# Patient Record
Sex: FEMALE | Race: WHITE | NOT HISPANIC OR LATINO | URBAN - METROPOLITAN AREA
[De-identification: names, ages, dates, MRNs, and addresses within clinical notes are randomized per-mention and may not be internally consistent; named-entity substitution may affect disease eponyms.]

---

## 2018-01-01 ENCOUNTER — EMERGENCY (EMERGENCY)
Facility: HOSPITAL | Age: 73
LOS: 0 days | End: 2018-09-20
Attending: EMERGENCY MEDICINE | Admitting: EMERGENCY MEDICINE

## 2018-01-01 ENCOUNTER — EMERGENCY (EMERGENCY)
Facility: HOSPITAL | Age: 73
LOS: 0 days | Discharge: HOME | End: 2018-08-09
Admitting: PHYSICIAN ASSISTANT

## 2018-01-01 VITALS
SYSTOLIC BLOOD PRESSURE: 106 MMHG | RESPIRATION RATE: 18 BRPM | HEART RATE: 103 BPM | TEMPERATURE: 98 F | OXYGEN SATURATION: 100 % | DIASTOLIC BLOOD PRESSURE: 43 MMHG

## 2018-01-01 VITALS — HEIGHT: 57 IN | WEIGHT: 149.91 LBS

## 2018-01-01 VITALS
TEMPERATURE: 99 F | SYSTOLIC BLOOD PRESSURE: 170 MMHG | DIASTOLIC BLOOD PRESSURE: 74 MMHG | OXYGEN SATURATION: 100 % | HEART RATE: 90 BPM | RESPIRATION RATE: 18 BRPM

## 2018-01-01 DIAGNOSIS — Z95.1 PRESENCE OF AORTOCORONARY BYPASS GRAFT: ICD-10-CM

## 2018-01-01 DIAGNOSIS — R04.0 EPISTAXIS: ICD-10-CM

## 2018-01-01 DIAGNOSIS — I46.9 CARDIAC ARREST, CAUSE UNSPECIFIED: ICD-10-CM

## 2018-01-01 DIAGNOSIS — Z79.899 OTHER LONG TERM (CURRENT) DRUG THERAPY: ICD-10-CM
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
## 2018-01-01 DIAGNOSIS — Z79.82 LONG TERM (CURRENT) USE OF ASPIRIN: ICD-10-CM

## 2018-01-01 DIAGNOSIS — M79.89 OTHER SPECIFIED SOFT TISSUE DISORDERS: ICD-10-CM

## 2018-01-01 DIAGNOSIS — Z88.0 ALLERGY STATUS TO PENICILLIN: ICD-10-CM

## 2018-01-01 DIAGNOSIS — E78.00 PURE HYPERCHOLESTEROLEMIA, UNSPECIFIED: ICD-10-CM

## 2018-01-01 DIAGNOSIS — I10 ESSENTIAL (PRIMARY) HYPERTENSION: ICD-10-CM
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
## 2018-01-01 DIAGNOSIS — I25.10 ATHEROSCLEROTIC HEART DISEASE OF NATIVE CORONARY ARTERY WITHOUT ANGINA PECTORIS: ICD-10-CM
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
## 2018-01-01 DIAGNOSIS — Z95.1 PRESENCE OF AORTOCORONARY BYPASS GRAFT: Chronic | ICD-10-CM

## 2018-01-01 LAB
BASE EXCESS BLDV CALC-SCNC: 3.2 MMOL/L — HIGH (ref -2–2)
HCO3 BLDV-SCNC: 28 MMOL/L — SIGNIFICANT CHANGE UP (ref 22–29)
LACTATE BLDV-MCNC: 2 MMOL/L — HIGH (ref 0.5–1.6)
PCO2 BLDV: 40 MMHG — LOW (ref 41–51)
PH BLDV: 7.45 — HIGH (ref 7.26–7.43)
PO2 BLDV: 34 MMHG — SIGNIFICANT CHANGE UP (ref 20–40)
SAO2 % BLDV: 67 % — SIGNIFICANT CHANGE UP

## 2018-01-01 RX ORDER — BUMETANIDE 0.25 MG/ML
1 INJECTION INTRAMUSCULAR; INTRAVENOUS
Qty: 0 | Refills: 0 | COMMUNITY

## 2018-01-01 RX ORDER — AMLODIPINE BESYLATE 2.5 MG/1
1 TABLET ORAL
Qty: 0 | Refills: 0 | COMMUNITY

## 2018-01-01 RX ORDER — LEVOTHYROXINE SODIUM 125 MCG
1 TABLET ORAL
Qty: 0 | Refills: 0 | COMMUNITY

## 2018-01-01 RX ORDER — ESCITALOPRAM OXALATE 10 MG/1
1 TABLET, FILM COATED ORAL
Qty: 0 | Refills: 0 | COMMUNITY

## 2018-01-01 RX ORDER — EZETIMIBE AND SIMVASTATIN 10; 80 MG/1; MG/1
1 TABLET, FILM COATED ORAL
Qty: 0 | Refills: 0 | COMMUNITY

## 2018-01-01 RX ORDER — METOPROLOL TARTRATE 50 MG
1 TABLET ORAL
Qty: 0 | Refills: 0 | COMMUNITY

## 2018-01-01 RX ORDER — LOSARTAN POTASSIUM 100 MG/1
1 TABLET, FILM COATED ORAL
Qty: 0 | Refills: 0 | COMMUNITY

## 2018-01-01 RX ORDER — ASPIRIN/CALCIUM CARB/MAGNESIUM 324 MG
1 TABLET ORAL
Qty: 0 | Refills: 0 | COMMUNITY

## 2018-08-09 NOTE — ED PROVIDER NOTE - MUSCULOSKELETAL, MLM
Spine appears normal, range of motion is not limited, no muscle or joint tenderness; (+) chronic swelling of bilateral lower extremities

## 2018-08-09 NOTE — ED PROVIDER NOTE - MEDICAL DECISION MAKING DETAILS
pt counseled on post-epistaxis care; told to continue all present meds; ENT referral; follow up with PCP in 2 days; any new or worsening symptoms, pt will return to ER pt counseled on post-epistaxis care; told to continue all present meds; ENT referral; follow up with PCP in 2 days; any new or worsening symptoms, pt will return to ER  Note complete

## 2018-08-09 NOTE — ED PROVIDER NOTE - OBJECTIVE STATEMENT
72 y.o. female with a PMHx of HTN and ASHD presented to the ER c/o Left sided epistaxis which began around 8 AM.  Pt states she was cleaning the inside of her nose with a tissue right before the bleeding started.  Pt on Baby ASA and Prasugrel.

## 2018-08-09 NOTE — ED PROVIDER NOTE - ENMT, MLM
Airway patent, (+) dry blood noted Left inner nasal mucosa without any active bleeding. Mouth with normal mucosa. Throat has no vesicles, no oropharyngeal exudates and uvula is midline. No postpharyngeal bleeding.

## 2018-08-09 NOTE — ED ADULT NURSE NOTE - OBJECTIVE STATEMENT
Pt stated pt was blowing her hose and c/o L sided nosebleed x 1day. Pt on Prasuguel and aspirin. Denies any pain or symptoms

## 2018-09-20 PROBLEM — E78.00 PURE HYPERCHOLESTEROLEMIA, UNSPECIFIED: Chronic | Status: ACTIVE | Noted: 2018-01-01

## 2018-09-20 PROBLEM — I10 ESSENTIAL (PRIMARY) HYPERTENSION: Chronic | Status: ACTIVE | Noted: 2018-01-01

## 2018-09-20 PROBLEM — M79.89 OTHER SPECIFIED SOFT TISSUE DISORDERS: Chronic | Status: ACTIVE | Noted: 2018-01-01

## 2018-09-20 NOTE — AIRWAY PLACEMENT NOTE ADULT - POST AIRWAY PLACEMENT ASSESSMENT:
positive end tidal CO2 noted/breath sounds bilateral/breath sounds equal/CXR pending/skin color improved/chest excursion noted

## 2018-09-20 NOTE — ED PROVIDER NOTE - ATTENDING CONTRIBUTION TO CARE
[facility] 74 [facility] and evaluated on arrival. bibems s/p fall 1 pta on a/c per daughter at bedside. Initial exam demonstrates intact primary survey, GCS15. Secondary survey yields no overt signs of trauma. Pt normotensive. On completion of chest x-ray MD notified that patient in cardiac arrest. CPR initiated. Pt was intubated. Multiple rounds of EPI, bicarb and calcium given. Initial rhythm demonstrated ? fine vfib, shock delivered. CPR performed for > 20min. Despite medical therapy, CPR and multiple attempts at defibrillation pt did not regain spontaneous circulation. CPR was terminated and the patient was pronounced. TTE prior to termination demonstrated lung sliding with positive pressure ventilation bl. and cardiac standstill. Pt was unresponsive to verbal or noxious stimuli. Pupils fixed and dilated. Pt had no spontaneous respirations. No palpable carotid or radial pulse. No heart sounds. No breath sounds. Time of death declared at 09:54AM. Daughter at bedside made aware and offered pastoral services. Family was offered and accepted autopsy. Medical examiner was notified of ED expiration in the setting of trauma and the case was excepted for eval by the ME.

## 2018-09-20 NOTE — ED ADULT NURSE NOTE - CHPI ED NUR SYMPTOMS POS
pt s.p fall at home on eliquis. pt denies any trauma to body/head. no s.s of any trauma noted to the patient

## 2018-09-20 NOTE — ED PROVIDER NOTE - PHYSICAL EXAMINATION
CONSTITUTIONAL: Well-developed; well-nourished; in no acute distress.   SKIN: warm, dry  HEAD: +diffuse mild redness in face, Normocephalic; atraumatic.  EYES: PERRL, EOMI, no conjunctival erythema  ENT: No nasal discharge; airway clear.  NECK: Supple; non tender.  CARD: S1, S2 normal; no murmurs, gallops, or rubs. Regular rate and rhythm.   RESP: No wheezes, rales or rhonchi.  ABD: soft ntnd  EXT: Normal ROM.  No clubbing, cyanosis or edema.   LYMPH: No acute cervical adenopathy.  NEURO: Alert, oriented, grossly unremarkable  PSYCH: Cooperative, appropriate.

## 2018-09-20 NOTE — ED ADULT NURSE REASSESSMENT NOTE - NS ED NURSE REASSESS COMMENT FT1
as patient was being evaluated by trauma team, pt was noted to be pulseless and apneic. cardiac arrested noted. CPR initiated. please see code flowsheet  for further info on cardiac arrest documentation

## 2018-09-20 NOTE — ED PROVIDER NOTE - NS ED ROS FT
General: No fever, No chills  Eyes:  No visual changes, eye pain or discharge.  ENMT:  No hearing changes, pain, no sore throat or runny nose, no difficulty swallowing  Cardiac:  No chest pain, SOB or edema. No chest pain with exertion.  Respiratory:  No cough or respiratory distress. No hemoptysis. No history of asthma or RAD.  GI:  No nausea, vomiting, diarrhea or abdominal pain.  :  No dysuria, frequency or burning.  MS:  No myalgia, muscle weakness, joint pain or back pain.  Neuro:  No headache or weakness.  No LOC.  Skin:  No skin rash.   Endocrine: No history of thyroid disease or diabetes.

## 2018-09-20 NOTE — ED PROVIDER NOTE - OBJECTIVE STATEMENT
73 y.o. F PMH CAD, s/p CABG on eliquis, Afib presented for a fall this morning. Last known normal was yesterday 73 y.o. F PMH CAD, s/p CABG on eliquis, Afib presented for a fall yesterday. Last known normal was yesterday 73 y.o. F PMH CAD, s/p CABG on eliquis, Afib presented for a fall yesterday. Last known normal was yesterday. She denies any pain, no nausea/vomiting, no abdominal pain. She was unable to walk after she fell.

## 2018-09-20 NOTE — ED ADULT NURSE NOTE - NSIMPLEMENTINTERV_GEN_ALL_ED
Implemented All Fall with Harm Risk Interventions:  [place] to call system. Call bell, personal items and telephone within reach. Instruct patient to call for assistance. Room bathroom lighting operational. Non-slip footwear when patient is off stretcher. Physically safe environment: no spills, clutter or unnecessary equipment. Stretcher in lowest position, wheels locked, appropriate side rails in place. Provide visual cue, wrist band, yellow gown, etc. Monitor gait and stability. Monitor for mental status changes and reorient to person, place, and time. Review medications for side effects contributing to fall risk. Reinforce activity limits and safety measures with patient and family. Provide visual clues: red socks.

## 2018-09-20 NOTE — ED ADULT TRIAGE NOTE - CHIEF COMPLAINT QUOTE
BIBA patient fell out of bed this am, as per daughter patient was on floor for a couple hours before pressing life alert button, patient is on eliquis as per daughter

## 2018-09-20 NOTE — ED PROVIDER NOTE - PROGRESS NOTE DETAILS
[facility]: 72 yo F seen and evaluated on arrival. bibems s/p fall 1 pta on a/c per daughter at bedside. Initial exam demonstrates intact primary survey, GCS15. Secondary survey yields no overt signs of trauma. Pt normotensive. On completion of chest x-ray MD notified that patient in cardiac arrest. Secondary survey yielded no signs of overt trauma, Secondary survey yielded no signs of overt trauma, Upon Xray, PT went into cardiac arrest. CPR was initiated. Pt was intubated, and Defibrillated twice with Vfib on monitor. Resident note yeilded different time of fall per pts daughter at bedside.
